# Patient Record
Sex: MALE | Race: WHITE | NOT HISPANIC OR LATINO | Employment: FULL TIME | URBAN - NONMETROPOLITAN AREA
[De-identification: names, ages, dates, MRNs, and addresses within clinical notes are randomized per-mention and may not be internally consistent; named-entity substitution may affect disease eponyms.]

---

## 2022-04-25 ENCOUNTER — OFFICE VISIT (OUTPATIENT)
Dept: FAMILY MEDICINE | Facility: OTHER | Age: 57
End: 2022-04-25

## 2022-04-25 VITALS
BODY MASS INDEX: 27.08 KG/M2 | HEART RATE: 68 BPM | OXYGEN SATURATION: 98 % | WEIGHT: 211 LBS | RESPIRATION RATE: 16 BRPM | SYSTOLIC BLOOD PRESSURE: 120 MMHG | DIASTOLIC BLOOD PRESSURE: 74 MMHG | HEIGHT: 74 IN

## 2022-04-25 DIAGNOSIS — Z02.89 ENCOUNTER FOR FEDERAL AVIATION ADMINISTRATION (FAA) EXAMINATION: Primary | ICD-10-CM

## 2022-04-25 LAB
ALBUMIN UR-MCNC: NEGATIVE MG/DL
APPEARANCE UR: CLEAR
BILIRUB UR QL STRIP: NEGATIVE
COLOR UR AUTO: NORMAL
GLUCOSE UR STRIP-MCNC: NEGATIVE MG/DL
HGB UR QL STRIP: NEGATIVE
KETONES UR STRIP-MCNC: NEGATIVE MG/DL
LEUKOCYTE ESTERASE UR QL STRIP: NEGATIVE
NITRATE UR QL: NEGATIVE
PH UR STRIP: 6 [PH] (ref 5–9)
SP GR UR STRIP: 1.01 (ref 1–1.03)
UROBILINOGEN UR STRIP-MCNC: NORMAL MG/DL

## 2022-04-25 PROCEDURE — 99396 PREV VISIT EST AGE 40-64: CPT | Performed by: FAMILY MEDICINE

## 2022-04-25 PROCEDURE — 999N001196 URINE MACROSCOPIC WITH REFLEX TO MICRO: Performed by: FAMILY MEDICINE

## 2022-04-25 NOTE — NURSING NOTE
Patient presents today for [facility] physical.    Medication Reconciliation Complete    Cyn Reyes LPN  4/25/2022 1:04 PM

## 2024-04-22 ENCOUNTER — TELEPHONE (OUTPATIENT)
Dept: FAMILY MEDICINE | Facility: OTHER | Age: 59
End: 2024-04-22

## 2024-04-22 NOTE — TELEPHONE ENCOUNTER
Called pt to schedule the next available appt and he declined as he can not wait that long.    Lacey Díaz on 4/22/2024 at 11:52 AM

## 2024-04-22 NOTE — TELEPHONE ENCOUNTER
Reason for call: Patient wanting a work in appointment.    Is the appointment for a Hospital Follow up?  No     (If yes - Unable to find an appointment with any provider during the time frame needed. Nurse/Provider - Can this patient be worked into a schedule with PCP or team member?)    Patient is having the following symptoms and/or what is the appt for:  Flight physical as soon as possible     The patient is requesting an appointment with  [facility]    Was an appointment offered for this call? Yes    If Yes, what is the date of the appointment?  7/22/24     Preferred method for responding to this message: Telephone Call    Phone number patient can be reached at? Cell number on file:    Telephone Information:   Mobile [phone]       If we can't reach you directly, may we leave a detailed response at the number you provided? Yes    Can this message wait until your PCP/provider returns if unavailable today? Yes    Chantal Porter on 4/22/2024 at 10:41 AM